# Patient Record
Sex: MALE | Race: BLACK OR AFRICAN AMERICAN | NOT HISPANIC OR LATINO | URBAN - METROPOLITAN AREA
[De-identification: names, ages, dates, MRNs, and addresses within clinical notes are randomized per-mention and may not be internally consistent; named-entity substitution may affect disease eponyms.]

---

## 2021-04-25 ENCOUNTER — EMERGENCY (EMERGENCY)
Facility: HOSPITAL | Age: 36
LOS: 2 days | Discharge: PSYCHIATRIC OR SUBSTANCE USE FACILITY | End: 2021-04-28
Attending: STUDENT IN AN ORGANIZED HEALTH CARE EDUCATION/TRAINING PROGRAM
Payer: MEDICAID

## 2021-04-25 VITALS
SYSTOLIC BLOOD PRESSURE: 112 MMHG | DIASTOLIC BLOOD PRESSURE: 64 MMHG | OXYGEN SATURATION: 100 % | WEIGHT: 315 LBS | HEIGHT: 76 IN

## 2021-04-25 DIAGNOSIS — F22 DELUSIONAL DISORDERS: ICD-10-CM

## 2021-04-25 DIAGNOSIS — Z20.822 CONTACT WITH AND (SUSPECTED) EXPOSURE TO COVID-19: ICD-10-CM

## 2021-04-25 DIAGNOSIS — F25.9 SCHIZOAFFECTIVE DISORDER, UNSPECIFIED: ICD-10-CM

## 2021-04-25 LAB
ALBUMIN SERPL ELPH-MCNC: 3.7 G/DL — SIGNIFICANT CHANGE UP (ref 3.3–5)
ALP SERPL-CCNC: 74 U/L — SIGNIFICANT CHANGE UP (ref 40–120)
ALT FLD-CCNC: 24 U/L — SIGNIFICANT CHANGE UP (ref 12–78)
ANION GAP SERPL CALC-SCNC: 6 MMOL/L — SIGNIFICANT CHANGE UP (ref 5–17)
APAP SERPL-MCNC: < 2 UG/ML (ref 10–30)
AST SERPL-CCNC: 45 U/L — HIGH (ref 15–37)
BASOPHILS # BLD AUTO: 0.02 K/UL — SIGNIFICANT CHANGE UP (ref 0–0.2)
BASOPHILS NFR BLD AUTO: 0.2 % — SIGNIFICANT CHANGE UP (ref 0–2)
BILIRUB SERPL-MCNC: 0.5 MG/DL — SIGNIFICANT CHANGE UP (ref 0.2–1.2)
BUN SERPL-MCNC: 12 MG/DL — SIGNIFICANT CHANGE UP (ref 7–23)
CALCIUM SERPL-MCNC: 8.9 MG/DL — SIGNIFICANT CHANGE UP (ref 8.5–10.1)
CHLORIDE SERPL-SCNC: 104 MMOL/L — SIGNIFICANT CHANGE UP (ref 96–108)
CO2 SERPL-SCNC: 27 MMOL/L — SIGNIFICANT CHANGE UP (ref 22–31)
CREAT SERPL-MCNC: 0.92 MG/DL — SIGNIFICANT CHANGE UP (ref 0.5–1.3)
EOSINOPHIL # BLD AUTO: 0.02 K/UL — SIGNIFICANT CHANGE UP (ref 0–0.5)
EOSINOPHIL NFR BLD AUTO: 0.2 % — SIGNIFICANT CHANGE UP (ref 0–6)
ETHANOL SERPL-MCNC: <10 MG/DL — SIGNIFICANT CHANGE UP (ref 0–10)
FLUAV AG NPH QL: SIGNIFICANT CHANGE UP
FLUBV AG NPH QL: SIGNIFICANT CHANGE UP
GLUCOSE SERPL-MCNC: 85 MG/DL — SIGNIFICANT CHANGE UP (ref 70–99)
HCT VFR BLD CALC: 40.1 % — SIGNIFICANT CHANGE UP (ref 39–50)
HGB BLD-MCNC: 13.3 G/DL — SIGNIFICANT CHANGE UP (ref 13–17)
IMM GRANULOCYTES NFR BLD AUTO: 0.3 % — SIGNIFICANT CHANGE UP (ref 0–1.5)
LYMPHOCYTES # BLD AUTO: 1.92 K/UL — SIGNIFICANT CHANGE UP (ref 1–3.3)
LYMPHOCYTES # BLD AUTO: 22.4 % — SIGNIFICANT CHANGE UP (ref 13–44)
MCHC RBC-ENTMCNC: 30.2 PG — SIGNIFICANT CHANGE UP (ref 27–34)
MCHC RBC-ENTMCNC: 33.2 GM/DL — SIGNIFICANT CHANGE UP (ref 32–36)
MCV RBC AUTO: 90.9 FL — SIGNIFICANT CHANGE UP (ref 80–100)
MONOCYTES # BLD AUTO: 0.56 K/UL — SIGNIFICANT CHANGE UP (ref 0–0.9)
MONOCYTES NFR BLD AUTO: 6.5 % — SIGNIFICANT CHANGE UP (ref 2–14)
NEUTROPHILS # BLD AUTO: 6.03 K/UL — SIGNIFICANT CHANGE UP (ref 1.8–7.4)
NEUTROPHILS NFR BLD AUTO: 70.4 % — SIGNIFICANT CHANGE UP (ref 43–77)
NRBC # BLD: 0 /100 WBCS — SIGNIFICANT CHANGE UP (ref 0–0)
PLATELET # BLD AUTO: 239 K/UL — SIGNIFICANT CHANGE UP (ref 150–400)
POTASSIUM SERPL-MCNC: 3.3 MMOL/L — LOW (ref 3.5–5.3)
POTASSIUM SERPL-SCNC: 3.3 MMOL/L — LOW (ref 3.5–5.3)
PROT SERPL-MCNC: 8.5 GM/DL — HIGH (ref 6–8.3)
RBC # BLD: 4.41 M/UL — SIGNIFICANT CHANGE UP (ref 4.2–5.8)
RBC # FLD: 13.7 % — SIGNIFICANT CHANGE UP (ref 10.3–14.5)
SALICYLATES SERPL-MCNC: 1.7 MG/DL — LOW (ref 2.8–20)
SARS-COV-2 RNA SPEC QL NAA+PROBE: SIGNIFICANT CHANGE UP
SODIUM SERPL-SCNC: 137 MMOL/L — SIGNIFICANT CHANGE UP (ref 135–145)
WBC # BLD: 8.58 K/UL — SIGNIFICANT CHANGE UP (ref 3.8–10.5)
WBC # FLD AUTO: 8.58 K/UL — SIGNIFICANT CHANGE UP (ref 3.8–10.5)

## 2021-04-25 PROCEDURE — 93010 ELECTROCARDIOGRAM REPORT: CPT

## 2021-04-25 PROCEDURE — 99285 EMERGENCY DEPT VISIT HI MDM: CPT

## 2021-04-25 RX ORDER — HALOPERIDOL DECANOATE 100 MG/ML
5 INJECTION INTRAMUSCULAR ONCE
Refills: 0 | Status: COMPLETED | OUTPATIENT
Start: 2021-04-25 | End: 2021-04-25

## 2021-04-25 RX ADMIN — Medication 2 MILLIGRAM(S): at 22:27

## 2021-04-25 RX ADMIN — HALOPERIDOL DECANOATE 5 MILLIGRAM(S): 100 INJECTION INTRAMUSCULAR at 22:27

## 2021-04-25 NOTE — ED ADULT NURSE NOTE - NSIMPLEMENTINTERV_GEN_ALL_ED
Implemented All Fall Risk Interventions:  [place] to call system. Call bell, personal items and telephone within reach. Instruct patient to call for assistance. Room bathroom lighting operational. Non-slip footwear when patient is off stretcher. Physically safe environment: no spills, clutter or unnecessary equipment. Stretcher in lowest position, wheels locked, appropriate side rails in place. Provide visual cue, wrist band, yellow gown, etc. Monitor gait and stability. Monitor for mental status changes and reorient to person, place, and time. Review medications for side effects contributing to fall risk. Reinforce activity limits and safety measures with patient and family.

## 2021-04-25 NOTE — ED PROVIDER NOTE - SKIN, MLM
10/17/2019        Maggie Rodgers  [address] [facility] [address]           Return to work    This is to certify that Maggie Rodgers has been under my care and is to be off of work 10/16/19 through 10/18/19.  May return to work on next scheduled work day.        Thank you,      _______________________________________________________   RAUL Ospina APNP  [facility], [facility]  [address] [facility] [facility] [address]  Dept Phone: [phone]   Skin normal color for race, warm, dry and intact. No evidence of rash.
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
## 2021-04-25 NOTE — ED ADULT NURSE NOTE - OBJECTIVE STATEMENT
35M bibems/Police from the street for psyche eval. Unknown medical/psyche history, patient uncooperative, paranoid.  Refusing to wear patient gown but complied with presence of security personnels.   Blood work sent, meds given as ordered.   Pending tele psyche in the morning.

## 2021-04-25 NOTE — ED PROVIDER NOTE - PROGRESS NOTE DETAILS
pt is awake ambulating to the bathroom with steady gait, TP called TP called Dr Woods tried to assess, pt is too sleepy, pt will be reassessed Pt was seen and treated by Dr. Zeng Pt is waiting for telepsyc eval and dispo. pt is awake, calm, cooperative now. telepsych Dr. Soto called here sts start pt on valproic acid 500 mg bid and Risperdal 1 mg bid and admit pt under 2PC. pt has been very comfortable and calm Pt's care is signed out to the next team standing meds ordered per psych reccomendations; overnight dandy developed agitation requiring PRN;s to be adminsitered; dandy now resting comfortably pt. became agitated, sedated for safety, limit setting/redirection unsuccessful   pending psych placement/bed, will hold and monitor standing meds ordered per psych reccomendations; overnight dandy developed agitation requiring PRN;s to be administered; dandy now resting comfortably code flight called as pt. attempted to leave ER to "find his girlfriend"  pt. found by security, will sedate again and monitor, still pending psych bed standing meds ordered per psych recommendations; overnight patient developed agitation requiring PRN's to be administered; patient now resting comfortably Pt resting but as per previous attending pt refusing to give urine, has peed the floor the last time we attempted collecting urine, otherwise has tried to escape ED as well last night - had to be sedated - pt now with bed to [facility] - Covid swab repeat requested - done and negative - called regarding placement urgently.

## 2021-04-26 DIAGNOSIS — F25.9 SCHIZOAFFECTIVE DISORDER, UNSPECIFIED: ICD-10-CM

## 2021-04-26 LAB
COVID-19 SPIKE DOMAIN AB INTERP: NEGATIVE — SIGNIFICANT CHANGE UP
COVID-19 SPIKE DOMAIN ANTIBODY RESULT: 0.4 U/ML — SIGNIFICANT CHANGE UP
SARS-COV-2 IGG+IGM SERPL QL IA: 0.4 U/ML — SIGNIFICANT CHANGE UP
SARS-COV-2 IGG+IGM SERPL QL IA: NEGATIVE — SIGNIFICANT CHANGE UP

## 2021-04-26 PROCEDURE — 90792 PSYCH DIAG EVAL W/MED SRVCS: CPT | Mod: 95

## 2021-04-26 RX ORDER — VALPROIC ACID (AS SODIUM SALT) 250 MG/5ML
500 SOLUTION, ORAL ORAL ONCE
Refills: 0 | Status: COMPLETED | OUTPATIENT
Start: 2021-04-26 | End: 2021-04-26

## 2021-04-26 RX ORDER — RISPERIDONE 4 MG/1
1 TABLET ORAL
Refills: 0 | Status: DISCONTINUED | OUTPATIENT
Start: 2021-04-27 | End: 2021-04-28

## 2021-04-26 RX ORDER — RISPERIDONE 4 MG/1
1 TABLET ORAL ONCE
Refills: 0 | Status: COMPLETED | OUTPATIENT
Start: 2021-04-26 | End: 2021-04-26

## 2021-04-26 RX ORDER — VALPROIC ACID (AS SODIUM SALT) 250 MG/5ML
500 SOLUTION, ORAL ORAL
Refills: 0 | Status: DISCONTINUED | OUTPATIENT
Start: 2021-04-27 | End: 2021-04-28

## 2021-04-26 RX ADMIN — Medication 500 MILLIGRAM(S): at 10:03
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
## 2021-04-26 NOTE — ED ADULT NURSE REASSESSMENT NOTE - NS ED NURSE REASSESS COMMENT FT1
Pateint mary ellen and alert, went to the bathroom to urinate with steady gait but refuse to give some urine. Pending Telepsyche in the morning.

## 2021-04-26 NOTE — ED ADULT NURSE REASSESSMENT NOTE - NS ED NURSE REASSESS COMMENT FT1
Report received from RN at 7am. Patient lying comfortably in bed. No distress noted. Pending Tele Psych for eval. Safety measures in place. Vitals as per flow sheet. Purposeful rounding done x1 hour. Will continue to monitor.
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
## 2021-04-26 NOTE — ED BEHAVIORAL HEALTH NOTE - BEHAVIORAL HEALTH NOTE
S/O: Patient was seen for reassessment around 19:30pm. Presents guarded, paranoid, and disorganized. States that he is being followed, "by everyone" and that "they want to touch me." Could not tell me specifically who. Continued to ramble nonsensically "I'm going to cut the power, cut the juice, and you owe me 100 rupees." He denied any AH or VH. Denied thoughts of SI/HI. Began to rase his voice when asked more direct questions so interview ended at this point.     A/P:  Unchanged.     As per Dr. Graham: "34yo self reportedly domiciled, unclear employment hx  AA M w/ reported/documented hx of schizophrenia with hx of AOT, state hospitalization, clozaril treatment, w/ hx of past psych hospitalizations who presented with noted disorganized behavior in the community. Patient was noted to espousing psychotic symptoms inc AH and paranoid statements upon arrival. He was also agitated and required prn meds. Pt was sent for re-evaluation.    Pt with documented hx of schizophrenia w/ hx of state hospitalization, AOT placement and clozaril treatment who came in with disorganized behavior w/ paranoid thought content w/ self disclosed AH. Patient continues to be disorganized and illogical thought process with paranoid thought content w/ irritability and tenuous impulse control. He requires psych hospitalization as he is potential harm to others and unable to care for himself. He will await available psych bed in ED.      1) restart VPA 500mg po bid  2) restart risperdal 1mg po bid  3) prn: haldol 5mg po/IM q6h prn agitation, ativan 2mg po/IM q6hr prn agitation  4) 1:1 in ED; elopement precaution  5) obtain utox  6) Telepsych to continue to follow -needs involuntary psych admission, pending bed availability."

## 2021-04-26 NOTE — ED ADULT NURSE REASSESSMENT NOTE - NS ED NURSE REASSESS COMMENT FT1
Pt remained on 1:1 in bed  for safety , pending telepsychiatry for disposition, dinner served , safety measures implemented will continue to monitor pt.
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
## 2021-04-27 PROCEDURE — 99213 OFFICE O/P EST LOW 20 MIN: CPT | Mod: 95

## 2021-04-27 RX ORDER — CHLORPROMAZINE HCL 10 MG
25 TABLET ORAL ONCE
Refills: 0 | Status: COMPLETED | OUTPATIENT
Start: 2021-04-27 | End: 2021-04-27

## 2021-04-27 RX ORDER — HALOPERIDOL DECANOATE 100 MG/ML
5 INJECTION INTRAMUSCULAR ONCE
Refills: 0 | Status: COMPLETED | OUTPATIENT
Start: 2021-04-27 | End: 2021-04-27

## 2021-04-27 RX ORDER — DIPHENHYDRAMINE HCL 50 MG
25 CAPSULE ORAL ONCE
Refills: 0 | Status: COMPLETED | OUTPATIENT
Start: 2021-04-27 | End: 2021-04-27

## 2021-04-27 RX ADMIN — Medication 25 MILLIGRAM(S): at 15:50

## 2021-04-27 RX ADMIN — RISPERIDONE 1 MILLIGRAM(S): 4 TABLET ORAL at 21:55

## 2021-04-27 RX ADMIN — Medication 500 MILLIGRAM(S): at 21:55

## 2021-04-27 RX ADMIN — Medication 2 MILLIGRAM(S): at 01:15

## 2021-04-27 RX ADMIN — Medication 2 MILLIGRAM(S): at 13:35

## 2021-04-27 RX ADMIN — HALOPERIDOL DECANOATE 5 MILLIGRAM(S): 100 INJECTION INTRAMUSCULAR at 14:13

## 2021-04-27 RX ADMIN — HALOPERIDOL DECANOATE 5 MILLIGRAM(S): 100 INJECTION INTRAMUSCULAR at 01:14

## 2021-04-27 RX ADMIN — Medication 25 MILLIGRAM(S): at 14:51

## 2021-04-27 NOTE — ED ADULT NURSE REASSESSMENT NOTE - NS ED NURSE REASSESS COMMENT FT1
Spoke to patient and he denies suicidal and homicidal ideations, but still refuses to give urine sample.

## 2021-04-27 NOTE — ED BEHAVIORAL HEALTH NOTE - BEHAVIORAL HEALTH NOTE
Telepsychiatry Reassessment Note:    MD received handoff on patient.     MD spoke to RN for updated ED course: during the night patient has been bizarre and uncooperative at times, he required PRN of IM haldol and ativan around 1AM. He has been more calm since that time and slept for a few hours.     A/P As per Dr. Graham: "34yo self reportedly domiciled, unclear employment hx  AA M w/ reported/documented hx of schizophrenia with hx of AOT, state hospitalization, clozaril treatment, w/ hx of past psych hospitalizations who presented with noted disorganized behavior in the community. Patient was noted to espousing psychotic symptoms inc AH and paranoid statements upon arrival. He was also agitated and required prn meds. Pt was sent for re-evaluation.    Pt with documented hx of schizophrenia w/ hx of state hospitalization, AOT placement and clozaril treatment who came in with disorganized behavior w/ paranoid thought content w/ self disclosed AH. Patient continues to be disorganized and illogical thought process with paranoid thought content w/ irritability and tenuous impulse control. He requires psych hospitalization as he is potential harm to others and unable to care for himself. He will await available psych bed in ED.      1) restart VPA 500mg po bid  2) restart risperdal 1mg po bid  3) prn: haldol 5mg po/IM q6h prn agitation, ativan 2mg po/IM q6hr prn agitation  4) 1:1 in ED; elopement precaution  5) obtain utox  6) Telepsych to continue to follow -needs involuntary psych admission, pending bed availability.".

## 2021-04-27 NOTE — ED BEHAVIORAL HEALTH NOTE - BEHAVIORAL HEALTH NOTE
Telepsychiatry Reassessment Note:    Pt reassessed at 11am.  Per 1:1 staff, pt has been refusing vital signs, stating give me "$30 million or let me go."  He remains disorganized, stating he would like to go to [place] to his apt though states it should be vacant and that he doesn't pay rent.  When asked about reason for presentation to the ED, pt states "because, uh, they need help and I tried to help but they abandoned me...the whole team."  Pt denies taking any medications and denies having any collateral contacts.  At one point during interview, pt started pointing to the wall, telling writer to  (with finger over lips), mentioning the DWAYNE.      COVID Exposure Screen- Patient (of note, unreliable/disorganized historian)  1.	*Have you had a COVID-19 test in the last 90 days?  (  ) Yes   ( x ) No, pt denies   (  ) Unknown- Reason: _____  2.	*Have you tested positive for COVID-19 antibodies? (  ) Yes   (  ) No   ( x ) Unknown- Reason: pt too disorganized to answer  3.	*Have you received 2 doses of the COVID-19 vaccine? (  ) Yes   (  ) No   ( x ) Unknown- Reason: pt too disorganized to answer  4.	*In the past 10 days, have you been around anyone with a positive COVID-19 test?* (  ) Yes   ( x ) No   (  ) Unknown- Reason: ____  5.	*Have you been out of [place] within the past 10 days?* (  ) Yes   ( x ) No   (  ) Unknown- Reason: _____    MSE:  appearance: fairly groomed  behavior: calm, poorly related  thought process: disorganized, illogical  thought content: paranoia  affect: intense, irritable  AVH: appears to be responding to internal stimuli  insight: poor  judgment: poor    A/P:  As per previous providers:   "36yo self reportedly domiciled, unclear employment hx AA M w/ reported/documented hx of schizophrenia with hx of AOT, state hospitalization, clozaril treatment, w/ hx of past psych hospitalizations who presented with noted disorganized behavior in the community. Patient was noted to espousing psychotic symptoms inc AH and paranoid statements upon arrival. He was also agitated and required prn meds. Pt was sent for re-evaluation.  Pt with documented hx of schizophrenia w/ hx of state hospitalization, AOT placement and clozaril treatment who came in with disorganized behavior w/ paranoid thought content w/ self disclosed AH. Patient continues to be disorganized and illogical thought process with paranoid thought content w/ irritability and tenuous impulse control. He requires psych hospitalization as he is potential harm to others and unable to care for himself. He will await available psych bed in ED."    1) continue VPA 500mg po bid  2) continue risperdal 1mg po bid  3) prn: haldol 5mg po/IM q6h prn agitation, ativan 2mg po/IM q6hr prn agitation  4) 1:1 in ED; elopement precaution  5) obtain utox/UA  6) Telepsych to continue to follow -needs involuntary psych admission, pending bed availability

## 2021-04-27 NOTE — ED ADULT NURSE REASSESSMENT NOTE - NS ED NURSE REASSESS COMMENT FT1
Spoke to patient and asked for urine and if there is any SI or HI and patient continues to refuse to answer my questions.

## 2021-04-28 VITALS
SYSTOLIC BLOOD PRESSURE: 133 MMHG | TEMPERATURE: 98 F | DIASTOLIC BLOOD PRESSURE: 78 MMHG | HEART RATE: 113 BPM | OXYGEN SATURATION: 98 % | RESPIRATION RATE: 18 BRPM

## 2021-04-28 LAB
FLUAV AG NPH QL: SIGNIFICANT CHANGE UP
FLUBV AG NPH QL: SIGNIFICANT CHANGE UP
SARS-COV-2 RNA SPEC QL NAA+PROBE: SIGNIFICANT CHANGE UP

## 2021-04-28 RX ORDER — RISPERIDONE 4 MG/1
1 TABLET ORAL ONCE
Refills: 0 | Status: COMPLETED | OUTPATIENT
Start: 2021-04-28 | End: 2021-04-28

## 2021-04-28 RX ORDER — MIDAZOLAM HYDROCHLORIDE 1 MG/ML
4 INJECTION, SOLUTION INTRAMUSCULAR; INTRAVENOUS ONCE
Refills: 0 | Status: DISCONTINUED | OUTPATIENT
Start: 2021-04-28 | End: 2021-04-28

## 2021-04-28 RX ORDER — CHLORPROMAZINE HCL 10 MG
25 TABLET ORAL ONCE
Refills: 0 | Status: COMPLETED | OUTPATIENT
Start: 2021-04-28 | End: 2021-04-28

## 2021-04-28 RX ORDER — DIPHENHYDRAMINE HCL 50 MG
50 CAPSULE ORAL ONCE
Refills: 0 | Status: COMPLETED | OUTPATIENT
Start: 2021-04-28 | End: 2021-04-28

## 2021-04-28 RX ORDER — DIPHENHYDRAMINE HCL 50 MG
25 CAPSULE ORAL ONCE
Refills: 0 | Status: COMPLETED | OUTPATIENT
Start: 2021-04-28 | End: 2021-04-28

## 2021-04-28 RX ADMIN — Medication 25 MILLIGRAM(S): at 02:03

## 2021-04-28 RX ADMIN — Medication 2 MILLIGRAM(S): at 10:45

## 2021-04-28 RX ADMIN — Medication 25 MILLIGRAM(S): at 06:02

## 2021-04-28 RX ADMIN — Medication 25 MILLIGRAM(S): at 02:02

## 2021-04-28 RX ADMIN — MIDAZOLAM HYDROCHLORIDE 4 MILLIGRAM(S): 1 INJECTION, SOLUTION INTRAMUSCULAR; INTRAVENOUS at 06:02

## 2021-04-28 RX ADMIN — Medication 50 MILLIGRAM(S): at 06:01

## 2021-04-28 RX ADMIN — RISPERIDONE 1 MILLIGRAM(S): 4 TABLET ORAL at 10:45

## 2021-04-28 NOTE — ED BEHAVIORAL HEALTH NOTE - BEHAVIORAL HEALTH NOTE
Telepsychiatry Reassessment Note:    MD received handoff on patient.     Patient seen via [employer] cart: he presents disorganized, discharge preoccupied initially, later very focused on spots on the wall and not able to engage with MD. He does not answer specific questions regarding symptoms.       A/P from prior eval: "36yo self reportedly domiciled, unclear employment hx  AA M w/ reported/documented hx of schizophrenia with hx of AOT, state hospitalization, clozaril treatment, w/ hx of past psych hospitalizations who presented with noted disorganized behavior in the community. Patient was noted to espousing psychotic symptoms inc AH and paranoid statements upon arrival. He was also agitated and required prn meds. Pt was sent for re-evaluation.    Pt with documented hx of schizophrenia w/ hx of state hospitalization, AOT placement and clozaril treatment who came in with disorganized behavior w/ paranoid thought content w/ self disclosed AH. Patient continues to be disorganized and illogical thought process with paranoid thought content w/ irritability and tenuous impulse control. He requires psych hospitalization as he is potential harm to others and unable to care for himself. He will await available psych bed in ED."  Patient continues to present psychotic and disorganized, requiring admission. COVID Ab negative, no bed available at this time as he does not qualify for observation unit at [facility].     - continue hold for bed, psychiatry to follow

## 2021-04-28 NOTE — ED ADULT NURSE REASSESSMENT NOTE - NS ED NURSE REASSESS COMMENT FT1
pt awake, alert, ambulate to bathroom accompanied by aid. no acute distress noted. awaiting placement.

## 2022-07-29 ENCOUNTER — EMERGENCY (EMERGENCY)
Facility: HOSPITAL | Age: 37
LOS: 1 days | Discharge: HOME | End: 2022-07-29
Attending: EMERGENCY MEDICINE | Admitting: EMERGENCY MEDICINE

## 2022-07-29 VITALS
OXYGEN SATURATION: 100 % | DIASTOLIC BLOOD PRESSURE: 71 MMHG | RESPIRATION RATE: 16 BRPM | HEART RATE: 78 BPM | TEMPERATURE: 98 F | SYSTOLIC BLOOD PRESSURE: 113 MMHG

## 2022-07-29 VITALS
TEMPERATURE: 98 F | HEART RATE: 97 BPM | SYSTOLIC BLOOD PRESSURE: 128 MMHG | RESPIRATION RATE: 18 BRPM | HEIGHT: 76 IN | DIASTOLIC BLOOD PRESSURE: 74 MMHG | OXYGEN SATURATION: 100 %

## 2022-07-29 PROCEDURE — 99283 EMERGENCY DEPT VISIT LOW MDM: CPT
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
## 2022-07-29 NOTE — ED PROVIDER NOTE - PATIENT PORTAL LINK FT
You can access the FollowMyHealth Patient Portal offered by [facility] by registering at the following website: [email or web address]. By joining [facility]’s FollowMyHealth portal, you will also be able to view your health information using other applications (apps) compatible with our system.
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
## 2022-07-29 NOTE — ED PROVIDER NOTE - CPE EDP CARDIAC NORM
normal... Interventional Radiology    72m with  urothelial cancer, Stage cII, Left radical nephrectomy, presenting with abdominal pain and blood in urine, Ct with bladder lesions and multiple osseous lytic lesions and lung nodules, Ir consulted for biopsy.      Allergies:   Medications (Abx/Cardiac/Anticoagulation/Blood Products)    amLODIPine   Tablet: 10 milliGRAM(s) Oral (07-27 @ 05:11)  enoxaparin Injectable: 40 milliGRAM(s) SubCutaneous (07-27 @ 12:04)  furosemide    Tablet: 20 milliGRAM(s) Oral (07-26 @ 05:47)  metoprolol succinate ER: 25 milliGRAM(s) Oral (07-27 @ 05:11)  tamsulosin: 0.4 milliGRAM(s) Oral (07-26 @ 21:04)    Data:    T(C): 36.9  HR: 81  BP: 147/74  RR: 17  SpO2: 97%    -WBC 8.33 / HgB 8.6 / Hct 25.2 / Plt 391  -Na 133 / Cl 95 / BUN 17 / Glucose 105  -K 3.6 / CO2 24 / Cr 1.35  -ALT -- / Alk Phos -- / T.Bili --  -INR -- / PTT 26.3    Radiology:     Assessment/Plan:   72m with  urothelial cancer, Stage cII, Left radical nephrectomy, presenting with abdominal pain and blood in urine, Ct with bladder lesions and multiple osseous lytic lesions and lung nodules, Ir consulted for biopsy.    Imaging reviewed, most likely will biopsy left pubic ramus lytic bone lesion.  plan for  above procedure Thursday 7/29. can put order for IR procedure under Dr. Lemon  NPO AMN for sedation  please recheck CBC BMP Coags 4AM  hold SQL the evening before   Interventional Radiology    72m with  urothelial cancer, Stage cII, Left radical nephrectomy, presenting with abdominal pain and blood in urine, Ct with bladder lesions and multiple osseous lytic lesions and lung nodules, Ir consulted for biopsy. Patient seen at bedside. Resting in bed comfortably NAD.     REVIEW OF SYSTEMS:    General:  No wt loss, fevers, chills, night sweats  CV:  No pain, palpitations, hypo/hypertension  Resp:  No dyspnea, cough, tachypnea, wheezing  GI:  No pain, nausea, vomiting, diarrhea, constipation  Breast:  No pain, abscess, mass, discharge  Neuro:  No weakness, tingling, memory problems  Skin:  No rash, tattoos, scars, edema    PAST MEDICAL & SURGICAL HISTORY:  Hypercholesterolemia  Anxiety  HTN (Hypertension)  Essential hypertension  History of BPH  History of SIADH  Urinary tract infection with hematuria  Gross hematuria  Left renal mass  Asthma  denies recent asthma exacerbation  Hyponatremia  admission x 2 last 2017  Cancer of kidney  History of stomach ulcers  denies recent endoscopy  Bladder tumor  S/P cystoscopy  Insertion left ureteral stent ; biopsy 8/19  History of prostate surgery  ? exact procedure 3/19  H/O left nephrectomy    Allergies    chocolate (Pruritus)  flour (Rash)  No Known Drug Allergies  Nuts (Rash)  Soy (Unknown)    Intolerances        MEDICATIONS  (STANDING):  amLODIPine   Tablet 10 milliGRAM(s) Oral daily  atorvastatin 10 milliGRAM(s) Oral at bedtime  budesonide  80 MICROgram(s)/formoterol 4.5 MICROgram(s) Inhaler 2 Puff(s) Inhalation two times a day  cholecalciferol 1000 Unit(s) Oral daily  cyanocobalamin 1000 MICROGram(s) Oral daily  finasteride 5 milliGRAM(s) Oral daily  fluticasone propionate 50 MICROgram(s)/spray Nasal Spray 1 Spray(s) Both Nostrils two times a day  furosemide    Tablet 20 milliGRAM(s) Oral daily  metoprolol succinate ER 25 milliGRAM(s) Oral daily  multivitamin 1 Tablet(s) Oral daily  pantoprazole    Tablet 40 milliGRAM(s) Oral before breakfast  polyethylene glycol 3350 17 Gram(s) Oral daily  senna 2 Tablet(s) Oral at bedtime  sodium chloride 1 Gram(s) Oral three times a day  tamsulosin 0.4 milliGRAM(s) Oral at bedtime    MEDICATIONS  (PRN):  acetaminophen   Tablet .. 650 milliGRAM(s) Oral every 6 hours PRN Temp greater or equal to 38.5C (101.3F), Mild Pain (1 - 3)  ALBUTerol    0.083% 2.5 milliGRAM(s) Nebulizer every 6 hours PRN Shortness of Breath and/or Wheezing  aluminum hydroxide/magnesium hydroxide/simethicone Suspension 30 milliLiter(s) Oral every 4 hours PRN Dyspepsia  melatonin 3 milliGRAM(s) Oral at bedtime PRN Insomnia  ondansetron Injectable 4 milliGRAM(s) IV Push every 8 hours PRN Nausea and/or Vomiting  simethicone 80 milliGRAM(s) Chew two times a day PRN Gas      SOCIAL HISTORY:    FAMILY HISTORY:  Family history of stroke    FH: lung cancer  brother      PHYSICAL EXAM:    Vital Signs Last 24 Hrs  T(C): 36.8 (28 Jul 2021 09:03), Max: 36.9 (27 Jul 2021 17:26)  T(F): 98.2 (28 Jul 2021 09:03), Max: 98.5 (27 Jul 2021 17:26)  HR: 74 (28 Jul 2021 09:03) (74 - 86)  BP: 124/65 (28 Jul 2021 09:03) (124/65 - 149/60)  BP(mean): --  RR: 18 (28 Jul 2021 09:03) (17 - 18)  SpO2: 98% (28 Jul 2021 09:03) (96% - 98%)    General:  Appears stated age, well-groomed, well-nourished, no distress  HEENT:  NC/AT  Chest:  Full & symmetric excursion, no increased effort, breath sounds clear  Skin:  No rash/erythema/ecchymoses  Musculoskeletal:  no gross abnormalities   Neuro/Psych:  Alert, oriented     LABS:                        9.2    7.25  )-----------( 382      ( 28 Jul 2021 07:11 )             26.6     07-28    131<L>  |  97<L>  |  16  ----------------------------<  109<H>  3.8   |  22  |  1.34<H>    Ca    9.6      28 Jul 2021 07:11  Phos  3.9     07-28  Mg     2.10     07-28
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
## 2022-07-29 NOTE — ED ADULT NURSE REASSESSMENT NOTE - NS ED NURSE REASSESS COMMENT FT1
pt A&ox4, awake and alert, calm and cooperative. no complaints of pain. pt denies SI and HI. breathing is spontaneous and unlabored. pt awaiting transportation service for D/C set up by [employer] work Jennifer.  Patient is being discharged today.  accompanied by self. Education provided via teach back method, verbalize understanding.  All personal belongings with patient. No complaints/signs/symptoms of pain, distress, or discomfort at this time.
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
## 2022-07-29 NOTE — ED ADULT TRIAGE NOTE - PAIN: PRESENCE, MLM
wrist/complains of pain/discomfort Render Post-Care Instructions In Note?: yes Post-Care Instructions: I reviewed with the patient in detail post-care instructions. Patient is to wear sunprotection, and avoid picking at any of the treated lesions. Pt may apply Vaseline to crusted or scabbing areas. Duration Of Freeze Thaw-Cycle (Seconds): 5 Consent: The patient's verbal consent was obtained including but not limited to risks of crusting, scabbing, blistering, scarring, darker or lighter pigmentary change, recurrence, incomplete removal and infection. Number Of Freeze-Thaw Cycles: 2 freeze-thaw cycles Detail Level: Detailed

## 2022-11-04 NOTE — ED BEHAVIORAL HEALTH ASSESSMENT NOTE - EMPLOYMENT
Well Visit, Ages 25 to 72: Care Instructions  Well visits can help you stay healthy. Your doctor has checked your overall health and may have suggested ways to take good care of yourself. Your doctor also may have recommended tests. You can help prevent illness with healthy eating, good sleep, vaccinations, regular exercise, and other steps. Get the tests that you and your doctor decide on. Depending on your age and risks, examples might include screening for diabetes; hepatitis C; HIV; and cervical, breast, lung, and colon cancer. Screening helps find diseases before any symptoms appear. Eat healthy foods. Choose fruits, vegetables, whole grains, lean protein, and low-fat dairy foods. Limit saturated fat and reduce salt. Limit alcohol. Men should have no more than 2 drinks a day. Women should have no more than 1. For some people, no alcohol is the best choice. Exercise. Get at least 30 minutes of exercise on most days of the week. Walking can be a good choice. Reach and stay at your healthy weight. This will lower your risk for many health problems. Take care of your mental health. Try to stay connected with friends, family, and community, and find ways to manage stress. If you're feeling depressed or hopeless, talk to someone. A counselor can help. If you don't have a counselor, talk to your doctor. Talk to your doctor if you think you may have a problem with alcohol or drug use. This includes prescription medicines and illegal drugs. Avoid tobacco and nicotine: Don't smoke, vape, or chew. If you need help quitting, talk to your doctor. Practice safer sex. Getting tested, using condoms or dental dams, and limiting sex partners can help prevent STIs. Use birth control if it's important to you to prevent pregnancy. Talk with your doctor about your choices and what might be best for you. Prevent problems where you can.  Protect your skin from too much sun, wash your hands, brush your teeth twice a day, and wear a seat belt in the car. Where can you learn more? Go to https://chpepiceweb.[employer]. org and sign in to your [employer] account. Enter P072 in the [facility] box to learn more about \"Well Visit, Ages 25 to 72: Care Instructions. \"     If you do not have an account, please click on the \"Sign Up Now\" link. Current as of: March 9, 2022               Content Version: 13.4  © 0355-8176 [employer], [employer]. Care instructions adapted under license by [facility] ([facility]). If you have questions about a medical condition or this instruction, always ask your healthcare professional. [address] any warranty or liability for your use of this information. Disabled
